# Patient Record
Sex: MALE | Race: WHITE | Employment: STUDENT | URBAN - METROPOLITAN AREA
[De-identification: names, ages, dates, MRNs, and addresses within clinical notes are randomized per-mention and may not be internally consistent; named-entity substitution may affect disease eponyms.]

---
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
## 2019-04-18 ENCOUNTER — TELEPHONE (OUTPATIENT)
Dept: FAMILY MEDICINE CLINIC | Age: 19
End: 2019-04-18

## 2023-10-17 PROBLEM — J01.90 ACUTE SINUSITIS: Status: ACTIVE | Noted: 2023-10-17

## 2023-10-17 PROBLEM — J02.9 PHARYNGITIS: Status: ACTIVE | Noted: 2023-10-17

## 2023-10-17 PROBLEM — S39.012A STRAIN OF BACK: Status: ACTIVE | Noted: 2023-10-17

## 2023-10-19 ENCOUNTER — OFFICE VISIT (OUTPATIENT)
Dept: ORTHOPEDIC SURGERY | Facility: CLINIC | Age: 23
End: 2023-10-19
Payer: COMMERCIAL

## 2023-10-19 DIAGNOSIS — M54.50 ACUTE BILATERAL LOW BACK PAIN, UNSPECIFIED WHETHER SCIATICA PRESENT: Primary | ICD-10-CM

## 2023-10-19 PROCEDURE — 99203 OFFICE O/P NEW LOW 30 MIN: CPT | Performed by: INTERNAL MEDICINE

## 2023-10-19 ASSESSMENT — PAIN - FUNCTIONAL ASSESSMENT: PAIN_FUNCTIONAL_ASSESSMENT: 0-10

## 2023-10-19 ASSESSMENT — PAIN SCALES - GENERAL: PAINLEVEL_OUTOF10: 2

## 2023-10-19 NOTE — LETTER
October 19, 2023     Patient: Santana Morrissey   YOB: 2000   Date of Visit: 10/19/2023       To Whom It May Concern:    It is my medical opinion that Santana Morrissey may return to work on 10/20/2023 but no lifting more than 15lbs until follow up visit .    If you have any questions or concerns, please don't hesitate to call.         Sincerely,        Amalia Spencer MD

## 2023-10-19 NOTE — PROGRESS NOTES
Acute Injury New Patient Visit    CC:   Chief Complaint   Patient presents with    Lower Back - New Patient Visit, Worker's Compensation     DOI: 9/25/232 lifting arrow sign  CT done at [facility]       HPI: Santana is a 23 y.o. male presents today with acute low back injury which occurred at work.  He injured himself at work on September 25, 2023, went to the emergency room had a CT scan done of the lumbar spine.  Results showed bilateral pars defect and bulging disc at L4-L5 and L5-S1.  He has minimal radicular symptoms.  Still some pain in his back.  And is here for second opinion.  No significant treatment has been done at this point.        Review of Systems   GENERAL: Negative for malaise, significant weight loss, fever  MUSCULOSKELETAL: See HPI  NEURO:  Negative for numbness / tingling     Past Medical History  History reviewed. No pertinent past medical history.    Medication review  Medication Documentation Review Audit       Reviewed by Genie Snyder CMA (Medical Assistant) on 10/19/23 at 1438      Medication Order Taking? Sig Documenting Provider Last Dose Status            No Medications to Display                                   Allergies  No Known Allergies    Social History  Social History     Socioeconomic History    Marital status: Single     Spouse name: Not on file    Number of children: Not on file    Years of education: Not on file    Highest education level: Not on file   Occupational History    Not on file   Tobacco Use    Smoking status: Never    Smokeless tobacco: Never   Substance and Sexual Activity    Alcohol use: Not on file    Drug use: Never    Sexual activity: Not on file   Other Topics Concern    Not on file   Social History Narrative    Not on file     Social Determinants of Health     Financial Resource Strain: Not on file   Food Insecurity: Not on file   Transportation Needs: Not on file   Physical Activity: Not on file   Stress: Not on file   Social Connections: Not on  file   Intimate Partner Violence: Not on file   Housing Stability: Not on file       Surgical History  History reviewed. No pertinent surgical history.    Physical Exam:  GENERAL:  Patient is awake, alert, and oriented to person place and time.  Patient appears well nourished and well kept.  Affect Calm, Not Acutely Distressed.  HEENT:  Normocephalic, Atraumatic, EOMI  CARDIOVASCULAR:  Hemodynamically stable.  RESPIRATORY:  Normal respirations with unlabored breathing.  Extremity: Lumbar spine examination shows skin is intact.  He can forward flex and reverse extend with some mild pain discomfort.  Some mild pain with left and right lateral rotation.  No midline lumbar tenderness.  Mild paraspinal tenderness.  No pain at the SI joints.  Quadricep strength is 5/ 5 in the right and 5/5 the left.  He is able to walk on his tiptoes and heels with no difficulties.  Negative straight leg test on the right and left leg.  He is neurovascular intact.      Diagnostics: Imaging reviewed  CT lumbar spine wo IV contrast  Narrative: Interpreted By:  MARY JO MOLINA MD  MRN: [id number]  Patient Name: SANTANA PEREZ     STUDY:  CT L-SPINE WO CONTRAST  9/25/2023 9:20 am     INDICATION:  Low back pain     COMPARISON:  None.     ACCESSION NUMBER(S):  [id number]     ORDERING CLINICIAN:  SRI YEN     TECHNIQUE:  Axial CT images of the lumbar spine are obtained. Axial, coronal and  sagittal reconstructions are provided for review.     FINDINGS:  Alignment is intact. The vertebral body heights and disc heights are  preserved. Bilateral pars defects are noted at L5-S1. Mild posterior  disc bulges at L4 on L5 causing likely mild central canal stenosis.  No acute fracture-dislocation.     Impression: Bilateral pars defect at L5/S1. No evidence of listhesis. Mild  broad-based posterior disc bulges from L4 through S1 causing mild  central canal compromise.     MACRO:  None      Procedure: None    Assessment: Low back pain    Plan: Santana  presents today for a second opinion for work injured his low back.  CT scan was done emergency room which did reveal a bilateral pars defect and lumbar herniated disc at L4-L5 and L5-S1.  We did recommend he get involved some physical therapy and also an MRI lumbar spine to evaluate for the extent of his pars defect.  We will do a transfer of a physician, to my name.  We will send for C9 for physical therapy.  We will also submit for C9 for MRI of the lumbar spine.  He may return to work tomorrow but no lifting, pushing or pulling more than 15 pounds.  He will follow-up with spine team in 3 to 4 weeks and go over MRI results.  Due to the findings of bilateral pars defect MRI is recommended to evaluate for the extent of the pars defect and to also evaluate for lumbar herniated disc.    Orders Placed This Encounter    MR lumbar spine wo IV contrast    Referral to Physical Therapy      At the conclusion of the visit there were no further questions by the patient/family regarding their plan of care.  Patient was instructed to call or return with any issues, questions, or concerns regarding their injury and/or treatment plan described above.     10/19/23 at 4:10 PM - Amalia Spencer MD    Office: [phone]    This note was prepared using voice recognition software.  The details of this note are correct and have been reviewed, and corrected to the best of my ability.  Some grammatical errors may persist related to the Dragon software.

## 2023-10-25 ENCOUNTER — TELEPHONE (OUTPATIENT)
Dept: PHYSICAL THERAPY | Facility: CLINIC | Age: 23
End: 2023-10-25
Payer: COMMERCIAL

## 2023-10-25 NOTE — TELEPHONE ENCOUNTER
Called and talk to meg he does not want to do therapy until he does the mri and knows the results

## 2023-11-08 ENCOUNTER — ANCILLARY PROCEDURE (OUTPATIENT)
Dept: RADIOLOGY | Facility: CLINIC | Age: 23
End: 2023-11-08
Payer: COMMERCIAL

## 2023-11-08 DIAGNOSIS — M54.50 ACUTE BILATERAL LOW BACK PAIN, UNSPECIFIED WHETHER SCIATICA PRESENT: ICD-10-CM

## 2023-11-08 PROCEDURE — 72148 MRI LUMBAR SPINE W/O DYE: CPT

## 2023-11-08 PROCEDURE — 72148 MRI LUMBAR SPINE W/O DYE: CPT | Performed by: RADIOLOGY

## 2023-11-28 ENCOUNTER — OFFICE VISIT (OUTPATIENT)
Dept: ORTHOPEDIC SURGERY | Facility: CLINIC | Age: 23
End: 2023-11-28
Payer: COMMERCIAL

## 2023-11-28 DIAGNOSIS — M47.816 SPONDYLOSIS OF LUMBAR SPINE: ICD-10-CM

## 2023-11-28 PROCEDURE — 99213 OFFICE O/P EST LOW 20 MIN: CPT | Performed by: INTERNAL MEDICINE

## 2023-11-28 PROCEDURE — G0463 HOSPITAL OUTPT CLINIC VISIT: HCPCS | Performed by: INTERNAL MEDICINE

## 2023-11-28 NOTE — PROGRESS NOTES
CC:   Chief Complaint   Patient presents with    Lower Back - Results, Follow-up     MRI Review         HPI: Santana is a 23 y.o. male presents today for an MRI follow-up of the lumbar spine which occurred at work.  He denies any radicular symptoms.  Still some pain in his low back.  Denies any stool or urinary continence.  He is here to go over MRI results.        Review of Systems   GENERAL: Negative for malaise, significant weight loss, fever  MUSCULOSKELETAL: See HPI  NEURO:  Negative for numbness / tingling     Past Medical History  No past medical history on file.    Medication review  Medication Documentation Review Audit       Reviewed by Genie Snyder CMA (Medical Assistant) on 10/19/23 at 1438      Medication Order Taking? Sig Documenting Provider Last Dose Status            No Medications to Display                                   Allergies  No Known Allergies    Social History  Social History     Socioeconomic History    Marital status: Single     Spouse name: Not on file    Number of children: Not on file    Years of education: Not on file    Highest education level: Not on file   Occupational History    Not on file   Tobacco Use    Smoking status: Never    Smokeless tobacco: Never   Substance and Sexual Activity    Alcohol use: Not on file    Drug use: Never    Sexual activity: Not on file   Other Topics Concern    Not on file   Social History Narrative    Not on file     Social Determinants of Health     Financial Resource Strain: Not on file   Food Insecurity: Not on file   Transportation Needs: Not on file   Physical Activity: Not on file   Stress: Not on file   Social Connections: Not on file   Intimate Partner Violence: Not on file   Housing Stability: Not on file       Surgical History  No past surgical history on file.    Physical Exam:  GENERAL:  Patient is awake, alert, and oriented to person place and time.  Patient appears well nourished and well kept.  Affect Calm, Not  Acutely Distressed.  HEENT:  Normocephalic, Atraumatic, EOMI  CARDIOVASCULAR:  Hemodynamically stable.  RESPIRATORY:  Normal respirations with unlabored breathing.  Extremity: Lumbar spine examination shows skin is intact.  He can forward flex and reverse extend with some minimal pain discomfort.  There is no pain with left and right lateral rotation.  There is no midline lumbar tenderness.  Quadricep strength is 5/5 on the right compared to 5 /5 left.  Some mild paraspinal tenderness.  Mild pain at the L5-S1.  He is able to walk on tiptoes and heels with no difficulties.  Negative straight leg test in the right or left leg.      Diagnostics: MRI reviewed  MR lumbar spine wo IV contrast  Narrative: Interpreted By:  Balaji Santana,   STUDY:  MR LUMBAR SPINE WO IV CONTRAST;  11/8/2023 4:05 pm      INDICATION:  Signs/Symptoms:pain.      COMPARISON:  CT examination of 09/25/2023.      ACCESSION NUMBER(S):  [id number]      ORDERING CLINICIAN:  HALEY RODRIGUEZ      TECHNIQUE:  Multiplanar and multisequential MR images of the lumbar spine are  performed.      FINDINGS:  There is disc desiccation and disc space narrowing at L5-S1. The  remaining lumbar disc space heights and signal are preserved.      The lumbar vertebral body heights are maintained. There is minimal  degenerative endplate signal at L5-S1. There is no acute bone marrow  edema.      There is bilateral L5 spondylolysis without acute bone marrow edema.  There is minimal anterolisthesis of L5 measuring 2 mm.      The conus medullaris is of normal morphology and signal. The tip of  the conus descends to the level of the L1-2 disc space.      Axial images are performed through the lumbar disc spaces from L1  through S1.      The L1-2 disc space level is unremarkable.      The L2-3 disc space level is unremarkable.      The L3-4 disc space level is unremarkable.      The L4-5 disc space level demonstrates minimal facet arthrosis on the  left though is  otherwise unremarkable.      The L5-S1 disc space level demonstrates minimal facet arthrosis.  There is mild bulging disc though no central canal or neural  foraminal stenosis.      Impression: Bilateral L5 spondylolysis, likely chronic. There is minimal  anterolisthesis measuring 2 mm. There superimposed discogenic  degenerative changes at the lumbosacral junction with mild bulging  disc.      No sign of lumbar central canal or neural foraminal stenosis.      Signed by: Balaji Santana 11/8/2023 4:25 PM  Dictation workstation:   [id number]        Procedure: None    Assessment:  1.  Lumbar strain  2.  Bilateral L5 spondylolysis    Plan: Santana presents here for MRI follow-up of the lumbar spine he is clinically doing well and has no radicular symptoms.  We did recommend get involved some physical therapy for his bilateral L5 spondylolysis.  Activity modification.  He will follow-up with the spine team in 5 to 6 weeks and reevaluate his back.  MRI were discussed in full detail today with the patient.    Orders Placed This Encounter    Referral to Physical Therapy      At the conclusion of the visit there were no further questions by the patient/family regarding their plan of care.  Patient was instructed to call or return with any issues, questions, or concerns regarding their injury and/or treatment plan described above.     11/28/23 at 5:05 PM - Amalia Spencer MD    Office: [phone]    This note was prepared using voice recognition software.  The details of this note are correct and have been reviewed, and corrected to the best of my ability.  Some grammatical errors may persist related to the Dragon software.

## 2024-01-16 ENCOUNTER — OFFICE VISIT (OUTPATIENT)
Dept: ORTHOPEDIC SURGERY | Facility: CLINIC | Age: 24
End: 2024-01-16
Payer: COMMERCIAL

## 2024-01-16 ENCOUNTER — DOCUMENTATION (OUTPATIENT)
Dept: ORTHOPEDIC SURGERY | Facility: CLINIC | Age: 24
End: 2024-01-16
Payer: COMMERCIAL

## 2024-01-16 ENCOUNTER — ANCILLARY PROCEDURE (OUTPATIENT)
Dept: RADIOLOGY | Facility: CLINIC | Age: 24
End: 2024-01-16
Payer: COMMERCIAL

## 2024-01-16 DIAGNOSIS — M54.50 LOW BACK PAIN, UNSPECIFIED BACK PAIN LATERALITY, UNSPECIFIED CHRONICITY, UNSPECIFIED WHETHER SCIATICA PRESENT: ICD-10-CM

## 2024-01-16 DIAGNOSIS — M43.16 SPONDYLOLISTHESIS OF LUMBAR REGION: Primary | ICD-10-CM

## 2024-01-16 PROCEDURE — 99215 OFFICE O/P EST HI 40 MIN: CPT | Performed by: ORTHOPAEDIC SURGERY

## 2024-01-16 PROCEDURE — 72114 X-RAY EXAM L-S SPINE BENDING: CPT | Performed by: ORTHOPAEDIC SURGERY

## 2024-01-16 PROCEDURE — 72120 X-RAY BEND ONLY L-S SPINE: CPT

## 2024-01-16 NOTE — PROGRESS NOTES
Santana Morrissey is a 23 y.o. male who presents for Worker's Compensation of the Lower Back (Sent by Dr. Spencer was lifting something at work 9/25/23 and had immediate pain./X-rays today/MRI and CT done at ).    HPI:  23-year-old [facility] new patient.  Sent by Dr. Spencer, the notes from his recent visit from November 28, 2023 were reviewed.  He has low back pain.  He denies any fever chills nausea vomiting night sweats but he has no bowel or bladder complaints.    Physical exam:  Well-nourished, well kept.No lymphangitis or lymphadenopathy in the examined extremities.  Gait normal.  Can stand on heels and toes.   Examination of the back shows mild tenderness in the paraspinous musculature.  There is only decreased range of motion in all directions due to guarding/muscle spasms and pain at extremes.  There is good strength and no instability.  Examination of the lower extremities reveals no point tenderness, swelling, or deformity.  Range of motion of the hips, knees, and ankles are full without crepitance, instability, or exacerbation of pain.  Strength is 5/5 throughout.  No redness, abrasions, or lesions on extremities  Gross sensation intact in the extremities.  Deep tendon reflexes 2+ bilateral. Clonus negative.  Affect normal.  Alert and oriented ×3.  Coordination normal.    Imaging studies:  AP lateral flexion-extension plain films were ordered and reviewed today.  An MRI from November 8, 2023 was reviewed CAT scan from September 25, 2023 was reviewed    Assessment:  Patient.  [facility].  23-year-old patient with mostly low back pain no radicular pain.  He had an injury at work several months ago, and is now having low back pain.  He has bilateral L5 pars defects, and it looks like he has a very minimal slip at L5-S1 with flexion and extension.  He is wrestled all his life, and  throughout school, and now works a very heavy labor construction job.  He has done physical therapy in the past, he has not done injections.   He never has had surgery.    Plan:  For complete plan and/or surgical details, please refer to Dr. Live's portion of this split dictation.    In a face-to-face encounter, I performed a history and physical examination, discussed pertinent diagnostic studies if indicated, and discussed diagnosis and management strategies with both the patient and the midlevel provider.  I reviewed the midlevel's note and agree with the documented findings and plan of care.    Patient with back pain.  Getting significantly worse.  He has an isthmic spondylolisthesis at L5-S1 with bilateral pars defects and degenerative disc at L5-S1.  I had a long discussion with the patient and explained to them that their options are 1) live with the symptoms and see how they evolve, 2) physical therapy, 3) pain management or 4) surgery.  I am not convinced injections would give him that much relief.  He has not done formal physical therapy just home physical therapy.  We did talk about surgery today which would be a fusion at L5-S1.  The pars repairs would not be a good idea on him as he does have degenerative disc and a mild spondylolisthesis at L5-S1 so I would expect him to continue to have back pain even with pars repairs.  I told him he should try to hold off on having a fusion for as long as he possibly could and he should do physical therapy and try to live with this.  He is amenable to that but he is considering surgery.  Will see him back on a as needed basis and see how he does with physical therapy.  He has a severe threat to inhibition of bodily function as his back pain is getting progressively worse and he has a bilateral pars defect isthmic spondylolisthesis and this will continue to progress throughout his life.

## 2024-01-16 NOTE — LETTER
January 16, 2024     Patient: Santana Morrissey   YOB: 2000   Date of Visit: 1/16/2024       To Whom It May Concern:    Santana Morrissey was seen in my clinic on 1/16/2024 at ?. Please excuse Santana for any time missed for today's appointment. He may return with no restrictions 01/16/2024.    If you have any questions or concerns, please don't hesitate to call.         Sincerely,         Dorothy Harmon CMA        CC: No Recipients

## 2024-01-22 ENCOUNTER — TELEPHONE (OUTPATIENT)
Dept: PHYSICAL THERAPY | Facility: CLINIC | Age: 24
End: 2024-01-22
Payer: COMMERCIAL

## 2024-01-22 NOTE — TELEPHONE ENCOUNTER
CALLED TO SCHEDULE PT FOR LOW BACK RECEIVED [facility] C9 APPROVAL FOR PHYSICAL THERAPY UNABLE TO LEAVE VM IT WAS FULL

## 2024-10-29 ENCOUNTER — OFFICE VISIT (OUTPATIENT)
Dept: ORTHOPEDIC SURGERY | Facility: CLINIC | Age: 24
End: 2024-10-29
Payer: COMMERCIAL

## 2024-10-29 ENCOUNTER — HOSPITAL ENCOUNTER (OUTPATIENT)
Dept: RADIOLOGY | Facility: CLINIC | Age: 24
Discharge: HOME | End: 2024-10-29
Payer: COMMERCIAL

## 2024-10-29 DIAGNOSIS — M54.50 LOW BACK PAIN, UNSPECIFIED BACK PAIN LATERALITY, UNSPECIFIED CHRONICITY, UNSPECIFIED WHETHER SCIATICA PRESENT: Primary | ICD-10-CM

## 2024-10-29 DIAGNOSIS — M54.50 LOW BACK PAIN, UNSPECIFIED BACK PAIN LATERALITY, UNSPECIFIED CHRONICITY, UNSPECIFIED WHETHER SCIATICA PRESENT: ICD-10-CM

## 2024-10-29 PROCEDURE — 72100 X-RAY EXAM L-S SPINE 2/3 VWS: CPT

## 2024-10-29 PROCEDURE — 99213 OFFICE O/P EST LOW 20 MIN: CPT | Performed by: ORTHOPAEDIC SURGERY

## 2024-10-29 PROCEDURE — 72100 X-RAY EXAM L-S SPINE 2/3 VWS: CPT | Performed by: ORTHOPAEDIC SURGERY
